# Patient Record
Sex: MALE | Race: ASIAN | Employment: FULL TIME | URBAN - METROPOLITAN AREA
[De-identification: names, ages, dates, MRNs, and addresses within clinical notes are randomized per-mention and may not be internally consistent; named-entity substitution may affect disease eponyms.]

---

## 2021-07-02 ENCOUNTER — TRANSCRIBE ORDER (OUTPATIENT)
Dept: SCHEDULING | Age: 56
End: 2021-07-02

## 2021-07-02 DIAGNOSIS — K21.9 ESOPHAGEAL REFLUX: Primary | ICD-10-CM

## 2021-07-08 ENCOUNTER — HOSPITAL ENCOUNTER (OUTPATIENT)
Dept: GENERAL RADIOLOGY | Age: 56
Discharge: HOME | End: 2021-07-08
Attending: INTERNAL MEDICINE
Payer: COMMERCIAL

## 2021-07-08 DIAGNOSIS — K21.9 ESOPHAGEAL REFLUX: ICD-10-CM

## 2021-07-08 PROCEDURE — 74011000250 HC RX REV CODE- 250: Performed by: INTERNAL MEDICINE

## 2021-07-08 PROCEDURE — 74240 X-RAY XM UPR GI TRC 1CNTRST: CPT

## 2021-07-08 RX ADMIN — BARIUM SULFATE 125 G: 960 POWDER, FOR SUSPENSION ORAL at 08:51

## 2021-07-08 RX ADMIN — ANTACID/ANTIFLATULENT 4 G: 380; 550; 10; 10 GRANULE, EFFERVESCENT ORAL at 08:51

## 2021-07-08 RX ADMIN — BARIUM SULFATE 110 ML: 980 POWDER, FOR SUSPENSION ORAL at 08:51

## 2021-10-28 ENCOUNTER — HOSPITAL ENCOUNTER (OUTPATIENT)
Dept: PREADMISSION TESTING | Age: 56
Discharge: HOME | End: 2021-10-28
Payer: COMMERCIAL

## 2021-10-28 PROCEDURE — U0003 INFECTIOUS AGENT DETECTION BY NUCLEIC ACID (DNA OR RNA); SEVERE ACUTE RESPIRATORY SYNDROME CORONAVIRUS 2 (SARS-COV-2) (CORONAVIRUS DISEASE [COVID-19]), AMPLIFIED PROBE TECHNIQUE, MAKING USE OF HIGH THROUGHPUT TECHNOLOGIES AS DESCRIBED BY CMS-2020-01-R: HCPCS

## 2021-10-29 LAB — SARS-COV-2, NAA: NOT DETECTED

## 2021-11-01 RX ORDER — SODIUM CHLORIDE 0.9 % (FLUSH) 0.9 %
5-40 SYRINGE (ML) INJECTION EVERY 8 HOURS
Status: CANCELLED | OUTPATIENT
Start: 2021-11-01

## 2021-11-01 RX ORDER — EPINEPHRINE 0.1 MG/ML
1 INJECTION INTRACARDIAC; INTRAVENOUS
Status: CANCELLED | OUTPATIENT
Start: 2021-11-01 | End: 2021-11-02

## 2021-11-01 RX ORDER — DEXTROMETHORPHAN/PSEUDOEPHED 2.5-7.5/.8
1.2 DROPS ORAL
Status: CANCELLED | OUTPATIENT
Start: 2021-11-01

## 2021-11-01 RX ORDER — ATROPINE SULFATE 0.1 MG/ML
0.5 INJECTION INTRAVENOUS
Status: CANCELLED | OUTPATIENT
Start: 2021-11-01 | End: 2021-11-02

## 2021-11-01 RX ORDER — SODIUM CHLORIDE 0.9 % (FLUSH) 0.9 %
5-40 SYRINGE (ML) INJECTION AS NEEDED
Status: CANCELLED | OUTPATIENT
Start: 2021-11-01

## 2021-11-01 RX ORDER — DIPHENHYDRAMINE HYDROCHLORIDE 50 MG/ML
50 INJECTION, SOLUTION INTRAMUSCULAR; INTRAVENOUS ONCE
Status: CANCELLED | OUTPATIENT
Start: 2021-11-01 | End: 2021-11-01

## 2021-11-02 ENCOUNTER — HOSPITAL ENCOUNTER (OUTPATIENT)
Age: 56
Setting detail: OUTPATIENT SURGERY
Discharge: HOME | End: 2021-11-02
Attending: INTERNAL MEDICINE | Admitting: INTERNAL MEDICINE
Payer: COMMERCIAL

## 2021-11-02 VITALS
HEIGHT: 70 IN | HEART RATE: 64 BPM | DIASTOLIC BLOOD PRESSURE: 66 MMHG | TEMPERATURE: 97.3 F | WEIGHT: 162.5 LBS | SYSTOLIC BLOOD PRESSURE: 90 MMHG | BODY MASS INDEX: 23.26 KG/M2 | RESPIRATION RATE: 18 BRPM | OXYGEN SATURATION: 95 %

## 2021-11-02 PROCEDURE — 88342 IMHCHEM/IMCYTCHM 1ST ANTB: CPT

## 2021-11-02 PROCEDURE — 77030021593 HC FCPS BIOP ENDOSC BSC -A: Performed by: INTERNAL MEDICINE

## 2021-11-02 PROCEDURE — 77030039961 HC KT CUST COLON BSC -D: Performed by: INTERNAL MEDICINE

## 2021-11-02 PROCEDURE — G0500 MOD SEDAT ENDO SERVICE >5YRS: HCPCS | Performed by: INTERNAL MEDICINE

## 2021-11-02 PROCEDURE — 74011250636 HC RX REV CODE- 250/636: Performed by: INTERNAL MEDICINE

## 2021-11-02 PROCEDURE — 88305 TISSUE EXAM BY PATHOLOGIST: CPT

## 2021-11-02 PROCEDURE — 77030040361 HC SLV COMPR DVT MDII -B: Performed by: INTERNAL MEDICINE

## 2021-11-02 PROCEDURE — 76040000007: Performed by: INTERNAL MEDICINE

## 2021-11-02 PROCEDURE — 2709999900 HC NON-CHARGEABLE SUPPLY: Performed by: INTERNAL MEDICINE

## 2021-11-02 RX ORDER — NALOXONE HYDROCHLORIDE 0.4 MG/ML
0.4 INJECTION, SOLUTION INTRAMUSCULAR; INTRAVENOUS; SUBCUTANEOUS
Status: DISCONTINUED | OUTPATIENT
Start: 2021-11-02 | End: 2021-11-02 | Stop reason: HOSPADM

## 2021-11-02 RX ORDER — ALBUTEROL SULFATE 90 UG/1
AEROSOL, METERED RESPIRATORY (INHALATION)
COMMUNITY

## 2021-11-02 RX ORDER — FEXOFENADINE HYDROCHLORIDE AND PSEUDOEPHEDRINE HYDROCHLORIDE 180; 240 MG/1; MG/1
1 TABLET, FILM COATED, EXTENDED RELEASE ORAL DAILY
COMMUNITY

## 2021-11-02 RX ORDER — FLUMAZENIL 0.1 MG/ML
0.2 INJECTION INTRAVENOUS
Status: DISCONTINUED | OUTPATIENT
Start: 2021-11-02 | End: 2021-11-02 | Stop reason: HOSPADM

## 2021-11-02 RX ORDER — FENTANYL CITRATE 50 UG/ML
100 INJECTION, SOLUTION INTRAMUSCULAR; INTRAVENOUS
Status: DISCONTINUED | OUTPATIENT
Start: 2021-11-02 | End: 2021-11-02 | Stop reason: HOSPADM

## 2021-11-02 RX ORDER — FAMOTIDINE 20 MG/1
20 TABLET, FILM COATED ORAL 2 TIMES DAILY
COMMUNITY

## 2021-11-02 RX ORDER — MIDAZOLAM HYDROCHLORIDE 1 MG/ML
.25-5 INJECTION, SOLUTION INTRAMUSCULAR; INTRAVENOUS
Status: DISCONTINUED | OUTPATIENT
Start: 2021-11-02 | End: 2021-11-02 | Stop reason: HOSPADM

## 2021-11-02 RX ORDER — SODIUM CHLORIDE 9 MG/ML
1000 INJECTION, SOLUTION INTRAVENOUS CONTINUOUS
Status: DISCONTINUED | OUTPATIENT
Start: 2021-11-02 | End: 2021-11-02 | Stop reason: HOSPADM

## 2021-11-02 RX ADMIN — SODIUM CHLORIDE 1000 ML: 900 INJECTION, SOLUTION INTRAVENOUS at 08:31

## 2021-11-02 NOTE — DISCHARGE INSTRUCTIONS
Bairon Madison  [id number]  1965    EGD DISCHARGE INSTRUCTIONS  Discomfort:  Sore throat- throat lozenges or warm salt water gargle  redness at IV site- apply warm compress to area; if redness or soreness persist- contact your physician  Gaseous discomfort- walking, belching will help relieve any discomfort  You may not operate a vehicle until the next day  You may not engage in an occupation involving machinery or appliances until the next day  You may not drink alcoholic beverages until the next day  Avoid making any critical decisions for at least 24 hour    DIET   You may not resume your regular diet. Antireflux diet. ACTIVITY  You may not resume your normal daily activities   Spend the remainder of the day resting -  avoid any strenuous activity. CALL M.D. ANY SIGN OF   Increasing pain, nausea, vomiting  Abdominal distension (swelling)  New increased bleeding (oral or rectal)  Fever (chills)  Pain in chest area  Bloody discharge from nose or mouth  Shortness of breath     You may not take any Advil, Aspirin, Ibuprofen, Motrin, Aleve, or Goodys ONLY  Tylenol as needed for pain. Follow-up Instructions: Follow-up in the office as scheduled or make a follow-up appointment in 2 weeks. Zena Tirado MD  November 2, 2021     DISCHARGE SUMMARY from Nurse    PATIENT INSTRUCTIONS:    After general anesthesia or intravenous sedation, for 24 hours or while taking prescription Narcotics:  · Limit your activities  · Do not drive and operate hazardous machinery  · Do not make important personal or business decisions  · Do  not drink alcoholic beverages  · If you have not urinated within 8 hours after discharge, please contact your surgeon on call.     Report the following to your surgeon:  · Excessive pain, swelling, redness or odor of or around the surgical area  · Temperature over 100.5  · Nausea and vomiting lasting longer than 4 hours or if unable to take medications  · Any signs of decreased circulation or nerve impairment to extremity: change in color, persistent  numbness, tingling, coldness or increase pain  · Any questions    What to do at Home:  Recommended activity: Activity as tolerated, noted above    If you experience any of the following symptoms noted above, please follow up with Dr. Juventino Rodriguez. *  Please give a list of your current medications to your Primary Care Provider. *  Please update this list whenever your medications are discontinued, doses are      changed, or new medications (including over-the-counter products) are added. *  Please carry medication information at all times in case of emergency situations. These are general instructions for a healthy lifestyle:    No smoking/ No tobacco products/ Avoid exposure to second hand smoke  Surgeon General's Warning:  Quitting smoking now greatly reduces serious risk to your health. Obesity, smoking, and sedentary lifestyle greatly increases your risk for illness    A healthy diet, regular physical exercise & weight monitoring are important for maintaining a healthy lifestyle    You may be retaining fluid if you have a history of heart failure or if you experience any of the following symptoms:  Weight gain of 3 pounds or more overnight or 5 pounds in a week, increased swelling in our hands or feet or shortness of breath while lying flat in bed. Please call your doctor as soon as you notice any of these symptoms; do not wait until your next office visit. The discharge information has been reviewed with the patient and friend Rony Herrera. The patient and Friend  verbalized understanding. Discharge medications reviewed with the patient and friend  appropriate educational materials and side effects teaching were provided.   Patient armband removed and shredded    ___________________________________________________________________________________________________________________________________

## 2021-11-02 NOTE — PROCEDURES
(EGD) Esophagogastroduodenoscopy (UPPER ENDOSCOPY) Procedure Note  [facility] [place]  __________________________________________________________________________________________________________________________      11/2/2021     Patient: Mavis Bach YOB: 1965 Gender: male Age: 64 y.o. INDICATION:  erosive esophagitis level 1 and short breen's documented by endoscopy in [place] in December 2020 was told to repeat in 6 months. he was treated for H Pylori 8 or 10  years ago  sometimes he has some substernal chest discomfort and left epigastric discomfort  he has been having heartburns for 6 months worse on awakening. No N/V or dysphagia. he has always an uncomfortable feeling in the upper chest  He has been taking Pantoprazole 40 mg bid x 5 months from end of December until early June then early June then switching to [address]. he is better but still has some discomfort. biopsy of the stomach and duodenum were normal negative for H Pylori. he doesn't drink he did smoke lightly in 2018 . He initially lost 8 kg by watching his diet . he doesn't eat late at night for 3 hours and lifts the head of the bed   before he used to take Nexium as needed for indigestions. in the last 6 months he developed Asthma which got worse when he came here to [place]. Since July he stopped taking the PPI and switched to Famotidine 20 mg at bed time which is working better. : Katie Hagan MD    Referring Provider:  None    Sedation:  Versed 5 mg IV, Fentanyl 150 mcg IV. Procedure Details:  After infomed consent was obtained for the procedure, with all risks and benefits of procedure explained to the family the patient was taken to the endoscopy suite and placed in the left lateral decubitus position. Following sequential administration of sedation as per above, the endoscope was inserted into the mouth and advanced under direct vision to third portion of the duodenum.   A careful inspection was made as the gastroscope was withdrawn, including a retroflexed view of the proximal stomach; findings and interventions are described below. OROPHARYNX: The vocal Cords and the larynx are normal.   ESOPHAGUS: The proximal oesophagus is normal. There is mild diffuse mucosal edema and dysmotility in the  mid, and distal esophagus but more spastic in the distal esophagus. 4 biopsies taken from the distal esophagus at 40 cm and 3 from the mid esophagus at 30 cmThe Z-Line is discretely irregular located at: 43 cm but no evidence of Strong's or erosive esophagitis. 1 cm reducible tiny Hiatal hernia. Diaphragmatic opening or notch is located at 44 cm. STOMACH: No evidence of blood, fluid or solid food retention. Very few tiny antral and prepyloric erosions. 5 gastric biopsies. The fundus on antegrade and retroflex views is normal. The cardia, body, lesser curvature, greater curvature and pylorus are normal. The gastric mucosa is normal.  DUODENUM: The bulb, second, third portions and major papilla are normal.  PROXIMAL JEJUNUM:  Not examined. Therapies:  5 gastric biopsies. 4 biopsies taken from the distal esophagus at 40 cm and 3 from the mid esophagus at 30 cm. Specimen: Three           Complications:   None    EBL:  Negligible. IMPLANTS: * No implants in log *  IMPRESSION: There is mild diffuse mucosal edema and dysmotility in the  mid, and distal esophagus but more spastic in the distal esophagus. 4 biopsies taken from the distal esophagus at 40 cm and 3 from the mid esophagus at 30 cm. The Z-Line is discretely irregular located at: 43 cm but no evidence of Strong's or erosive esophagitis. There is a tiny 1 cm reducible hiatal hernia. Very few tiny antral and prepyloric erosions. 5 gastric biopsies. RECOMMENDATION:  May resume antireflux diet. Avoid NSAID's. Avoid eating for 3 hours before reclining. Make a FU appointment at the office. Treat bouts of heartburns with OTC antacids. Continue taking Famotidine 20 mg at bed time.  May have to do barium swallow    Assistant: None    --Atul Matamoros MD on 11/2/2021 at 9:59 AM

## 2021-11-02 NOTE — H&P
Assessment/Plan  # Detail Type Description    1. Assessment Breen's esophagus (K22.70). Patient Plan erosive esophagitis level 1 and short breen's documented by endoscopy in [place] in 2020 was told to repeat in 6 months. he was treated for H Pylori 8 or 10  years ago  sometimes he has some substernal chest discomfort and left epigastric discomfort  he has been having heartburns for 6 months worse on awakening. No N/V or dysphagia. he has always an uncomfortable feeling in the upper chest  He has been taking Pantoprazole 40 mg bid x 5 months from end of December until early  then early  then switching to [address] [place] [address]. he is better but still has some discomfort. biopsy of the stomach and duodenum were normal negative for H Pylori. he doesn't drink he did smoke lightly in 2018 . weight has dropped 8 kg mostly because of diet . he doesn't eat late at night for 3 hours and lifts the head of the bed   before he used to take Nexium as needed for indigestions. in the last 6 months he developed Asthma which got worse when he came here to [place]. he is very concerned about esophageal cancer as his brother in law  from esophageal cancer in his 63's  For now I told him to stop the dexilant and take it as needed only. I took the time to explain to him Breen's esophagus and GERD . I explained to him how to treat heartburns through diet and life style modification. he should avoid NSAID's, also excess of Coffee and Alcohol which he doesn't do anyway. told him about low fat and low spice diet. Should treat his acid breakthrough with OTC Gaviscon. he should try the Famotidine at bed time as his symptoms appear to be mostly at night. for now I would send him for UGI series to see how bad is his lower esophageal sphincter and do later an EGD in early november                This 64year old male presents for Breen's Esophagus. History of Present Illness  1.   Breen's Esophagus   The onset of the heartburn was 6 months ago. The patient reports heartburn. Context: history of Strong's. Associated symptoms include dysphagia and reflux. Pertinent negatives include nausea, vomiting and weight loss. Additional information: Last EGD 12/102/2020 in [facility] [address]. Repeat in 6 months          Problem List  No active problems    Past Medical/Surgical History   (Detailed)  Disease/disorder Onset Date Management Date Comments   Asthma         History of appendectomy           Family History   (Detailed)      Social History  (Detailed)  Tobacco use reviewed. Preferred language is [facility]. Marital Status/Family/Social Support  Marital status:      Tobacco use status: Current non-smoker. Smoking status: Never smoker. Tobacco Screening  Patient has never used tobacco. Patient has not used tobacco in the last 30 days. Patient has not used smokeless tobacco in the last 30 days. Smoking Status  Type Smoking Status Usage Per Day Years Used Pack Years Total Pack Years    Never smoker         Alcohol  There is no history of alcohol use. Caffeine  The patient does not use caffeine. Druze/Spiritual  Patient does not agree to transfusion.        Medications (active prior to today)  Medication Instructions Start Date Stop Date Refilled Elsewhere   Dexilant 60 mg capsule, delayed release take 1 capsule by oral route  every day //   Y   albuterol sulfate HFA 90 mcg/actuation aerosol inhaler inhale 2 puff by inhalation route  every 4 - 6 hours as needed //   Y   Zyrtec 10 mg tablet take 1 tablet by oral route  every day //   Y         Medications (Added, Continued or Stopped today)  Start Date Medication Directions PRN Status PRN Reason Instruction Stop Date    albuterol sulfate HFA 90 mcg/actuation aerosol inhaler inhale 2 puff by inhalation route  every 4 - 6 hours as needed N       Dexilant 60 mg capsule, delayed release take 1 capsule by oral route  every day N       Zyrtec 10 mg tablet take 1 tablet by oral route  every day N        Allergies  Ingredient Reaction (Severity) Medication Name Comment   NO KNOWN ALLERGIES            Orders  Status Lab Order Time Frame Comments   ordered Complete PFT     result received Allergens, Zone 2     result received CBC With Differential/Platelet     result received Immunoglobulin E, Total         Review of Systems  System Neg/Pos Details   Constitutional Negative Chills, Fever, Malaise and Weight loss. ENMT Negative Ear infections, Nasal congestion and Sinus Infection. Eyes Negative Double vision and Eye pain. Respiratory Negative Asthma, Pleuritic pain and Wheezing. Cardio Negative Chest pain, Edema and Irregular heartbeat/palpitations. GI Positive Dysphagia, Reflux. GI Negative Abdominal pain, Change in bowel habits, Constipation, Decreased appetite, Diarrhea, Heartburn, Hematemesis, Hematochezia, Nausea and Vomiting.  Negative Dysuria, Hematuria, Urinary frequency, Urinary incontinence and Urinary retention. Endocrine Negative Cold intolerance, Gynecomastia, Heat intolerance and Increased thirst.   Neuro Negative Dizziness, Headache, Numbness, Tremors and Vertigo. Psych Negative Anxiety, Depression and Increased stress. Integumentary Negative Hives, Pruritus and Rash. MS Negative Back pain, Joint pain and Myalgia. Hema/Lymph Negative Easy bleeding, Easy bruising and Lymphadenopathy. Allergic/Immuno Negative Chemicals in work place, Contact allergy, Food allergies, Immunosuppression and Seasonal allergies. Reproductive Negative Penile discharge and Sexual dysfunction.        Vital Signs   Height  Time ft in cm Last Measured Height Position   4:01 PM 5.0 9.69 177.00 07/01/2021      Weight/BSA/BMI  Time lb oz kg Context BMI kg/m2 BSA m2   4:01 .00  72.121 dressed with shoes 23.02      Date/Time Temp Pulse BP Cardiac Rhythm [facility]   11/02/21 0819 97.1 °F (36.2 °C) 52Abnormal  103/74  [facility] Respiratory Therapy (last day)    Date/Time Resp SpO2 O2 Device O2 Flow Rate (L/min) [facility]   11/02/21 0819 16 97 % None (Room air)  [facility]       Physical  Exam  Exam Findings Details   Constitutional Normal No acute distress. Well Nourished. Well developed. Eyes Normal General - Right: Normal, Left: Normal. Conjunctiva - Right: Normal, Left: Normal. Sclera - Right: Normal, Left: Normal. Cornea - Right: Normal, Left: Normal. Pupil - Right: Normal, Left: Normal.   Nose/Mouth/Throat Normal Lips/teeth/gums - Normal. Tongue - Normal. Buccal mucosa - Normal. Palate & uvula - Normal.   Neck Exam Normal Inspection - Normal. Palpation - Normal. Thyroid gland - Normal. Cervical lymph nodes - Normal.   Respiratory Normal Inspection - Normal. Auscultation - Normal. Percussion - Normal. Cough - Absent. Effort - Normal.   Cardiovascular Normal Heart rate - Regular rate. Heart sounds - Normal S1, Normal S2. Murmurs - None. Extremities - No edema. Abdomen Normal Inspection - Normal. Appliance(s) - None. Abdominal muscles - Normal. Auscultation - Normal. Percussion - Normal. Anterior palpation - Normal, No guarding, No rebound. CVA tenderness - None. Umbilicus - Normal. Abdominal reflexes - Normal. No abdominal tenderness. No hepatic enlargement. No splenic enlargement. No hernia. No Ascites. No palpable mass. Vaughan's sign - Negative. Skin Normal Inspection - Normal.   Musculoskeletal Normal Hands - Left: Normal, Right: Normal.   Extremity Normal No cyanosis. No edema. Clubbing - Absent. Neurological Normal Level of consciousness - Normal. Orientation - Normal. Memory - Normal. Motor - Normal. Balance & gait - Normal. Coordination - Normal. Fine motor skills - Normal. DTRs - Normal.   Psychiatric Normal Orientation - Oriented to time, place, person & situation. Not anxious. Appropriate mood and affect. Behavior appropriate for age. Sufficient language. No memory loss.      Active Patient Care Team Members  Name Contact Agency Type Support Role Relationship Active Date Inactive Date Specialty   [facility]   encounter provider    Pulmonary Medicine   [facility]   Patient provider PCP      [facility]   primary practice provider       Crescencio Grubbs   Emergency Contact Friend        He stopped the Nexium and switched in July to Famotidine 20 mg HS. It is working better for him he has some substernal chest discomfort and occasional heartburns. No n/Vx or dysphagia.  Weight has been stable

## 2022-04-14 ENCOUNTER — TRANSCRIBE ORDER (OUTPATIENT)
Dept: SCHEDULING | Age: 57
End: 2022-04-14

## 2022-04-14 DIAGNOSIS — K21.00 GASTRO-ESOPHAGEAL REFLUX DISEASE WITH ESOPHAGITIS, WITHOUT BLEEDING: Primary | ICD-10-CM

## 2022-04-22 ENCOUNTER — HOSPITAL ENCOUNTER (OUTPATIENT)
Dept: GENERAL RADIOLOGY | Age: 57
Discharge: HOME | End: 2022-04-22
Attending: INTERNAL MEDICINE
Payer: COMMERCIAL

## 2022-04-22 DIAGNOSIS — K21.00 GASTRO-ESOPHAGEAL REFLUX DISEASE WITH ESOPHAGITIS, WITHOUT BLEEDING: ICD-10-CM

## 2022-04-22 PROCEDURE — 74011000250 HC RX REV CODE- 250: Performed by: INTERNAL MEDICINE

## 2022-04-22 PROCEDURE — 74220 X-RAY XM ESOPHAGUS 1CNTRST: CPT

## 2022-04-22 RX ADMIN — BARIUM SULFATE 135 ML: 980 POWDER, FOR SUSPENSION ORAL at 10:11

## 2022-04-22 RX ADMIN — BARIUM SULFATE 700 MG: 700 TABLET ORAL at 10:12

## 2022-04-22 RX ADMIN — ANTACID/ANTIFLATULENT 4 G: 380; 550; 10; 10 GRANULE, EFFERVESCENT ORAL at 10:12

## 2022-04-22 RX ADMIN — BARIUM SULFATE 176 G: 960 POWDER, FOR SUSPENSION ORAL at 10:11

## (undated) DEVICE — GARMENT,MEDLINE,DVT,INT,CALF,MED, GEN2: Brand: MEDLINE

## (undated) DEVICE — SPONGE GZ W4XL4IN COT 12 PLY TYP VII WVN C FLD DSGN

## (undated) DEVICE — SINGLE PORT MANIFOLD: Brand: NEPTUNE 2

## (undated) DEVICE — KENDALL RADIOLUCENT FOAM MONITORING ELECTRODE RECTANGULAR SHAPE: Brand: KENDALL

## (undated) DEVICE — TUBING, SUCTION, 1/4" X 12', STRAIGHT: Brand: MEDLINE

## (undated) DEVICE — Device

## (undated) DEVICE — REM POLYHESIVE ADULT PATIENT RETURN ELECTRODE: Brand: VALLEYLAB

## (undated) DEVICE — TRAP SPEC COLL POLYP POLYSTYR --

## (undated) DEVICE — MOUTHPIECE ENDOSCP 20X27MM --

## (undated) DEVICE — MAJ-1414 SINGLE USE ADPATER BIOPSY VALV: Brand: SINGLE USE ADAPTOR BIOPSY VALVE

## (undated) DEVICE — FORCEPS BX CAP 240CM L RAD JAW 4